# Patient Record
Sex: MALE | Race: WHITE
[De-identification: names, ages, dates, MRNs, and addresses within clinical notes are randomized per-mention and may not be internally consistent; named-entity substitution may affect disease eponyms.]

---

## 2018-10-27 ENCOUNTER — HOSPITAL ENCOUNTER (EMERGENCY)
Dept: HOSPITAL 25 - ED | Age: 33
Discharge: HOME | End: 2018-10-27
Payer: COMMERCIAL

## 2018-10-27 VITALS — DIASTOLIC BLOOD PRESSURE: 82 MMHG | SYSTOLIC BLOOD PRESSURE: 133 MMHG

## 2018-10-27 DIAGNOSIS — N45.2: Primary | ICD-10-CM

## 2018-10-27 DIAGNOSIS — R10.30: ICD-10-CM

## 2018-10-27 LAB
BASOPHILS # BLD AUTO: 0 10^3/UL (ref 0–0.2)
EOSINOPHIL # BLD AUTO: 0 10^3/UL (ref 0–0.6)
HCT VFR BLD AUTO: 40 % (ref 42–52)
HGB BLD-MCNC: 13.8 G/DL (ref 14–18)
LYMPHOCYTES # BLD AUTO: 1.3 10^3/UL (ref 1–4.8)
MCH RBC QN AUTO: 31 PG (ref 27–31)
MCHC RBC AUTO-ENTMCNC: 34 G/DL (ref 31–36)
MCV RBC AUTO: 90 FL (ref 80–94)
MONOCYTES # BLD AUTO: 0.7 10^3/UL (ref 0–0.8)
NEUTROPHILS # BLD AUTO: 5.7 10^3/UL (ref 1.5–7.7)
NRBC # BLD AUTO: 0 10^3/UL
NRBC BLD QL AUTO: 0.1
PLATELET # BLD AUTO: 194 10^3/UL (ref 150–450)
RBC # BLD AUTO: 4.48 10^6/UL (ref 4–5.4)
WBC # BLD AUTO: 7.8 10^3/UL (ref 3.5–10.8)

## 2018-10-27 PROCEDURE — 99282 EMERGENCY DEPT VISIT SF MDM: CPT

## 2018-10-27 PROCEDURE — 74176 CT ABD & PELVIS W/O CONTRAST: CPT

## 2018-10-27 PROCEDURE — 96372 THER/PROPH/DIAG INJ SC/IM: CPT

## 2018-10-27 PROCEDURE — 83690 ASSAY OF LIPASE: CPT

## 2018-10-27 PROCEDURE — 80053 COMPREHEN METABOLIC PANEL: CPT

## 2018-10-27 PROCEDURE — 85025 COMPLETE CBC W/AUTO DIFF WBC: CPT

## 2018-10-27 PROCEDURE — 81003 URINALYSIS AUTO W/O SCOPE: CPT

## 2018-10-27 PROCEDURE — 36415 COLL VENOUS BLD VENIPUNCTURE: CPT

## 2018-10-27 PROCEDURE — 76870 US EXAM SCROTUM: CPT

## 2018-10-27 NOTE — RAD
INDICATION: Right testicular pain.



COMPARISON:  There are no relevant prior studies available for comparison.



TECHNIQUE:  Multiple real-time images of the testicles were obtained including color

Doppler images and Doppler tracings.



FINDINGS: The right testicle is mildly enlarged and heterogeneous in echogenicity. The

left testicle appears to be within normal limits in size without focal abnormality. The

right testicle measured 4.9 x 2.6 x 3.3 cm and the left testicle measured 1.1 x 2.6 x 2.4

cm.



There is increased vascular flow throughout the right testicle. There appears to be normal

vascular flow within the left testicle. There appears to be symmetric vascular flow to the

epididymides.  No hydrocele is present. There is a 1.5 cm left epididymal head cyst. 



IMPRESSION:  HETEROGENEOUS SLIGHTLY ENLARGED RIGHT TESTICLE WITH INCREASED VASCULARITY.

CONSIDER ORCHITIS LESS LIKELY A NEOPLASTIC PROCESS. RECOMMEND UROLOGIC CONSULTATION AND

FOLLOW-UP ULTRASOUND STUDIES TO DEMONSTRATE RESOLUTION.

## 2018-10-27 NOTE — RAD
INDICATION:  Right renal colic.



COMPARISON:  There are no relevant prior studies available for comparison.



TECHNIQUE: A CT scan of the abdomen and pelvis was performed without intravenous and

without oral contrast. Contiguous axial sections were obtained from the lung bases through

the symphysis pubis. Images were reconstructed in the coronal and sagittal planes.



FINDINGS:  



LUNG BASES: The lung bases are clear.  No pleural effusion is present.



LIVER: The liver is normal in size. No significant focal abnormality is seen on this

noncontrast study.



GALLBLADDER: No calcified gallstones are seen.



BILE DUCTS: No intra or extrahepatic ductal distention is seen.



SPLEEN: The spleen is normal in size without significant focal abnormality.



PANCREAS: The pancreas is normal in size. No ductal distention or calcifications are seen.



ADRENAL GLANDS: The adrenal glands are normal in size.



KIDNEYS: The kidneys are normal in size. No renal calculi or hydronephrosis is seen. There

are several calcifications within the pelvis on the left side limiting the exam slightly

although no ureteral or bladder calculi are seen.



AORTA: The aorta is normal in caliber without significant calcific plaque.



LYMPH NODES: No significantly enlarged lymph nodes are seen.



BOWEL: The stomach, small and large bowel appear nondistended.  The appendix appears to be

within normal limits.  There are scattered diverticula within the colon. There is no

evidence for diverticulitis or colitis.



PELVIC ORGANS: No bladder wall thickening is seen. The prostate gland appears mildly

enlarged.



PERITONEUM: No free intraperitoneal air or fluid is seen.



BONES: No significant focal osseous abnormality is seen.



IMPRESSION:  NO EVIDENCE FOR ACUTE FINDING.

## 2018-10-27 NOTE — ED
GI/ HPI





- HPI Summary


HPI Summary: 


Patient is a 33 y/o M w/ c/o testicular discomfort and lower abdomen onsetting 

11 days ago. He states that his son dropped a phone on his testicules and he 

has been experiencing discomfort since. He notes that his testicles feel 

swollen. Patient went to  and states that he was told to come to ED for 

ultrasound. He reports that UA done at  was negative. He denies burning/pain 

with urination or ejaculation. In room, associated severity is rated 3/10. On 

triage movement, sleeping on stomach is noted to aggravate Sx, nothing is 

reported to alleviate Sx. Home medications and allergies are reviewed. 








- History of Current Complaint


Chief Complaint: EDAbdPain


Time Seen by Provider: 10/27/18 11:09


Stated Complaint: ABD/TESTICULAR PAIN


Hx Obtained From: Patient


Onset/Duration: Started Days Ago - 11 days ago, Still Present


Timing: Constant, Lasting Days - 11 days ago


Current Severity: Mild - 3/10


Pain Intensity: 3


Additional Locations for Males: Testicles


Associated Signs and Symptoms: Positive: Abdominal Pain - lower, Other: - No 

pain/burning with ejaculation, reports testicular swelling.  Negative: Dysuria


Aggravating Factor(s): Movement, Movement


Alleviating Factor(s): Nothing





- Allergy/Home Medications


Allergies/Adverse Reactions: 


 Allergies











Allergy/AdvReac Type Severity Reaction Status Date / Time


 


No Known Allergies Allergy   Verified 10/27/18 11:03











Home Medications: 


 Home Medications





Albuterol inh POWDER (NF) [Proair Respiclick] 1 puff INH Q4H PRN 10/27/18 [

History Confirmed 10/27/18]











PMH/Surg Hx/FS Hx/Imm Hx


Sensory History: 


   Denies: Hx Legally Blind, Hx Deafness


Opthamlomology History: 


   Denies: Hx Legally Blind


EENT History: 


   Denies: Hx Deafness





- Surgical History


Surgery Procedure, Year, and Place: wisdom teeth removed


Infectious Disease History: No


Infectious Disease History: 


   Denies: Traveled Outside the [place] in Last 30 Days





- Family History


Known Family History: 


   Negative: Blood Disorder





- Social History


Alcohol Use: Rare


Substance Use Type: Reports: None


Smoking Status (MU): Never Smoked Tobacco





Review of Systems


Positive: Abdominal Pain - lower 


Positive: pain - testicular , other - NEGATIVE: difficulty with ejaculation 

POSITIVE: testicular swelling .  Negative: dysuria


All Other Systems Reviewed And Are Negative: Yes





Physical Exam





- Summary


Physical Exam Summary: 





Appearance: Well appearing, no pain distress


Skin: warm, dry, reflects adequate perfusion


Head/face: normal


Eyes: EOMI, JOSHUA


ENT: normal


Neck: supple, non-tender


Respiratory: CTA, breath sounds present


Cardiovascular: RRR, pulses symmetrical  


Abdomen: non-tender, soft


Bowel: present


Musculoskeletal: normal, strength/ROM intact


Neuro: normal, sensory motor intact, A&Ox3


Genital Exam: mild tenderness over right testicle 








Triage Information Reviewed: Yes


Vital Signs On Initial Exam: 


 Initial Vitals











Temp Pulse Resp BP Pulse Ox


 


 99.8 F   90   14   142/96   99 


 


 10/27/18 10:59  10/27/18 10:59  10/27/18 10:59  10/27/18 10:59  10/27/18 10:59











Vital Signs Reviewed: Yes





Diagnostics





- Vital Signs


 Vital Signs











  Temp Pulse Resp BP Pulse Ox


 


 10/27/18 10:59  99.8 F  90  14  142/96  99














- Laboratory


Lab Results: 


 Lab Results











  10/27/18 10/27/18 10/27/18 Range/Units





  12:09 12:09 12:32 


 


WBC  7.8    (3.5-10.8)  10^3/ul


 


RBC  4.48    (4.00-5.40)  10^6/ul


 


Hgb  13.8 L    (14.0-18.0)  g/dl


 


Hct  40 L    (42-52)  %


 


MCV  90    (80-94)  fL


 


MCH  31    (27-31)  pg


 


MCHC  34    (31-36)  g/dl


 


RDW  13    (10.5-15)  %


 


Plt Count  194    (150-450)  10^3/ul


 


MPV  8.6    (7.4-10.4)  um3


 


Neut % (Auto)  73.6    (38-83)  %


 


Lymph % (Auto)  17.1 L    (25-47)  %


 


Mono % (Auto)  8.5 H    (0-7)  %


 


Eos % (Auto)  0.4    (0-6)  %


 


Baso % (Auto)  0.4    (0-2)  %


 


Absolute Neuts (auto)  5.7    (1.5-7.7)  10^3/ul


 


Absolute Lymphs (auto)  1.3    (1.0-4.8)  10^3/ul


 


Absolute Monos (auto)  0.7    (0-0.8)  10^3/ul


 


Absolute Eos (auto)  0    (0-0.6)  10^3/ul


 


Absolute Basos (auto)  0    (0-0.2)  10^3/ul


 


Absolute Nucleated RBC  0    10^3/ul


 


Nucleated RBC %  0.1    


 


Sodium   141   (135-145)  mmol/L


 


Potassium   4.6   (3.5-5.0)  mmol/L


 


Chloride   109   (101-111)  mmol/L


 


Carbon Dioxide   27   (22-32)  mmol/L


 


Anion Gap   5   (2-11)  mmol/L


 


BUN   9   (6-24)  mg/dL


 


Creatinine   0.84   (0.67-1.17)  mg/dL


 


Est GFR ( Amer)   126.6   (>60)  


 


Est GFR (Non-Af Amer)   104.6   (>60)  


 


BUN/Creatinine Ratio   10.7   (8-20)  


 


Glucose   109 H   ()  mg/dL


 


Calcium   9.5   (8.6-10.3)  mg/dL


 


Total Bilirubin   0.30   (0.2-1.0)  mg/dL


 


AST   18   (13-39)  U/L


 


ALT   19   (7-52)  U/L


 


Alkaline Phosphatase   66   ()  U/L


 


Total Protein   7.5   (6.4-8.9)  g/dL


 


Albumin   4.5   (3.2-5.2)  g/dL


 


Globulin   3.0   (2-4)  g/dL


 


Albumin/Globulin Ratio   1.5   (1-3)  


 


Lipase   27   (11.0-82.0)  U/L


 


Urine Color    Colorless  


 


Urine Appearance    Clear  


 


Urine pH    7.0  (5-9)  


 


Ur Specific Gravity    1.002 L  (1.010-1.030)  


 


Urine Protein    Negative  (Negative)  


 


Urine Ketones    Negative  (Negative)  


 


Urine Blood    Negative  (Negative)  


 


Urine Nitrate    Negative  (Negative)  


 


Urine Bilirubin    Negative  (Negative)  


 


Urine Urobilinogen    Negative  (Negative)  


 


Ur Leukocyte Esterase    Negative  (Negative)  


 


Urine Glucose    Negative  (Negative)  











Result Diagrams: 


 10/27/18 12:09





 10/27/18 12:09


Lab Statement: Any lab studies that have been ordered have been reviewed, and 

results considered in the medical decision making process.





- CT


  ** CT ABD/PEL


CT Interpretation Completed By: Radiologist


Summary of CT Findings: No evidence for acute findings, this report was 

reviewed by ED physician.





- Ultrasound


  ** No standard instances


Ultrasound Interpretation Completed By: Radiologist


Summary of Ultrasound Findings: IMPRESSION:  HETEROGENEOUS SLIGHTLY ENLARGED 

RIGHT TESTICLE WITH INCREASED VASCULARITY.  CONSIDER ORCHITIS LESS LIKELY A 

NEOPLASTIC PROCESS. RECOMMEND UROLOGIC CONSULTATION AND.  FOLLOW-UP ULTRASOUND 

STUDIES TO DEMONSTRATE RESOLUTION.  THIS REPORT WAS REVIEWED BY ED PHYSICIAN.





Re-Evaluation





- Re-Evaluation


  ** First Eval


Re-Evaluation Time: 13:30


Comment: Discussed results of labs and tests with patient. Patient will follow 

up with urologist in three days. Patient is agreeable with this plan.





GIGU Course/Dx





- Course


Course Of Treatment: Patient is a 33 y/o M w/ c/o testicular discomfort and 

lower abdomen onsetting 11 days ago. He states that his son dropped a phone on 

his testicules and he has been experiencing discomfort since. He notes that his 

testicles feel swollen. Patient went to  and states that he was told to come 

to ED for ultrasound. He reports that UA done at  was negative. He denies 

burning/pain with urination or ejaculation. On phyiscal exam, patient is noted 

to have mild tenderness over right testicle. During ED course, patient received 

Motrin 600 mg, Vibramycin 100 mg, and Rocephin 250 mg. Bloodwork/UA was 

obtained. CT ABD/PEL showed no evidence for acute findings. Testicular US 

IMPRESSION:  HETEROGENEOUS SLIGHTLY ENLARGED RIGHT TESTICLE WITH INCREASED 

VASCULARITY.  CONSIDER ORCHITIS LESS LIKELY A NEOPLASTIC PROCESS. RECOMMEND 

UROLOGIC CONSULTATION AND.  FOLLOW-UP ULTRASOUND STUDIES TO DEMONSTRATE 

RESOLUTION. Discussed results of labs and tests with patient. Patient will 

follow up with urologist in three days. Patient is agreeable with this plan. Dx 

of orchitis.





- Diagnoses


Differential Diagnoses - Male: Epididymitis, Ureteral Calculi, Urinary Tract 

Infection


Provider Diagnoses: 


 Orchitis








Discharge





- Sign-Out/Discharge


Documenting (check all that apply): Patient Departure - discharge 





- Discharge Plan


Condition: Stable


Disposition: HOME


Prescriptions: 


DOXYcycline CAP(*) [DOXYcycline 100MG CAP(*)] 100 mg PO BID #20 cap


Ibuprofen TAB* [Motrin TAB* 600 MG] 600 mg PO Q8H PRN #15 tab MDD 3


 PRN Reason: Pain


Patient Education Materials:  Orchitis (ED)


Referrals: 


Shiv Medina MD [Medical Doctor] - 3 Days


Additional Instructions: 


FOLLOW UP WITH UROLOGIST IN THREE DAYS. RETURN TO ED FOR ANY NEW OR WORSENING 

SYMPTOMS. 





- Billing Disposition and Condition


Condition: STABLE


Disposition: Home





- Attestation Statements


Document Initiated by Scribe: Yes


Documenting Scribe: Joe Yu 


Provider For Whom Netta is Documenting (Include Credential): Justino Morales MD


Scribe Attestation: 


Joe ABBOTT , scribed for Justino Morales MD on 10/27/18 at 1628. 


Scribe Documentation Reviewed: Yes


Provider Attestation: 


The documentation as recorded by the Joe hughes  accurately reflects 

the service I personally performed and the decisions made by me, Justino Morales MD